# Patient Record
Sex: FEMALE | Race: WHITE | URBAN - METROPOLITAN AREA
[De-identification: names, ages, dates, MRNs, and addresses within clinical notes are randomized per-mention and may not be internally consistent; named-entity substitution may affect disease eponyms.]

---

## 2017-12-12 NOTE — TELEPHONE ENCOUNTER
APPT INFO    Date /Time: 12/18/17   Reason for Appt: laryngitis   Ref Provider/Clinic: Patients vocal    Are there internal records? If yes, list: Yes;  LETICIA Parmar in 2016   Patient Contact (Y/N) & Call Details: no   Action: Chart Reviewed

## 2017-12-18 ENCOUNTER — PRE VISIT (OUTPATIENT)
Dept: OTOLARYNGOLOGY | Facility: CLINIC | Age: 22
End: 2017-12-18

## 2017-12-18 ENCOUNTER — OFFICE VISIT (OUTPATIENT)
Dept: OTOLARYNGOLOGY | Facility: CLINIC | Age: 22
End: 2017-12-18
Payer: COMMERCIAL

## 2017-12-18 DIAGNOSIS — R49.0 DYSPHONIA: Primary | ICD-10-CM

## 2017-12-18 NOTE — PROGRESS NOTES
[facility] VOICE [facility]  Lauri Vigil Jr., M.D., F.A.C.S.  Makayla Negro M.D., M.P.H.  Zuleika Pizano, Ph.D., CCC/SLP  Joy Mckeon M.M. (voice), MRUBÉN., CCC/SLP  Brandon Krishna M.M. (voice), M.RAMON., [facility]/SLP    [facility] VOICE [facility]  VOICE/SPEECH/BREATHING EVALUATION AND LARYNGEAL EXAMINATION REPORT    Patient: Valerie Roberto  Date of Visit: 12/18/2017    HISTORY  Valerie Roberto was seen for initial voice evaluation, laryngeal examination and treatment today.  She is self-referred to this clinic, on recommendation of her , Dr. Simran Gilman, who is well-known to me. She was accompanied to this session by Dr. Gilman.  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    Symptoms began years ago    Large tonsils; multiple consultations in childhood    Hx Lyme Disease since age 17 (2012); this resulted in increased sore throats and swollen glands    Mononucleosis spring of 2016; tonsils became severely enlarged at the tail end of the course of the virus    Saw Dr. Parmar throughout this; eventual tonsillectomy in June 2016; started feeling much better, and singing improved     Able to build voice back up in [id number] school year    Minimal singing or talking this past summer, although she did try to vocalize every day    Voice problems began again this semester    Voice comes and goes; can become aphonic while singing    Loss of strength in both speech and singing    Feels hoarse in both speech and singing    Resonance pattern has changed    Feels the middle and upper-middle portion of pitch range is most impaired    Voice can be normal    Feels that voice problem is not necessarily related to how much she is using her voice    Feels that voice is not overworked, but feels vocal fatigue    Pain and dryness in throat every morning    Thinks she may be mouth-breathing    Voice can be normal even when there is pain and dryness in the  morning    Senior at [employer]    Student of Dr. Simran Gilman, who is well known to me    DIAGNOSIS/REASON FOR REFERRAL  Dysphonia/ Evaluate, perform laryngeal exam, treat as appropriate    Patient Supplied Answers To [facility] General Questionnaire  [facility] - General Form Review 12/18/2017   Are you having any of these symptoms? Total loss of voice, Increased effort to talk/sing, Throat irritation, Throat tightness, Pain/ache in throat, Shortness of breath, Poor voice quality   Are you having any of these symptoms? Voice tires easily, Voice breaks, Breathy voice   Do you use caffeine? Yes   If you do use caffeine, how many oz. do you typically drink in a day? 8   How many oz. of non-caffeinated fluid do you typically drink in a day? 10   How often do you experience heartburn, indigestion, chest pain, stomach acid coming up, and/or tasting acid in your mouth or throat? Never   Have reflux medications been recommended to you? No   If yes, are you taking them regularly? No   Voice: Yes   Swallowing: No   If anyone is helping you complete this form (such as an , clinic staff, caregiver, family member, etc.) please identify them here. Simran Gilman       Patient Supplied Answers To [facility] Intake Voice Questionnaire  [facility] 12/18/2017   How long have you had this voice problem? 4 months   In regards to your voice problem, was there anything unusual about the time the problem was first noticed (such as illness, accident, surgery, etc.)?  If yes, please describe.  You have 200 characters to respond.  We will ask for more detail at your visit. tonsillectomy and laryngitis    How quickly did the voice problem develop? Gradually   For your voice problem, how do the symptoms vary? Worse in the AM, Worse after heavy voice use   Over time, how has the voice problem changed? Worse   How would you rate your typical vocal demand? Routine: frequent periods of talking on most  days; most talking is conversational speech   Please list activities that involve your voice (such as singing, coaching, etc.): singing and customer service    Effort for speaking 6   Effort for singing 8   Overall vocal quality 3   Is your voice ever normal, even briefly? Yes   What health care professionals have you seen for this voice problem?  Who and when? none   For your voice problem, what testing/studies have you done (such as imaging/reflux testing)? none   Did you receive any therapy or treatment for your voice problem?  Please describe briefly. no   Prior to this episode, have you ever had a voice problem before?  If yes, at that time did you have therapy or treatment for the voice problem?  Please provide a brief description of that treatment. yes had surgery   For your voice problem, is there anything else you'd like to tell us? no   There isn't much I can do to help myself feel better about this problem. Disagree somewhat   How I deal with the voice problem now is under my control. Agree somewhat   I don't have much control over my emotional reactions to this problem. Strongly Disagree   When I am upset about the voice problem, I can find a way to feel better. Agree somewhat   I have control over my day-to-day reactions to the voice problem. Strongly Agree   There isn't much I can do to keep the voice problem from affecting me. Disagree somewhat   I have control over how I think about the voice problem. Strongly Agree   My reaction to the voice problem is not under my control. Strongly Disagree   VPCMEAN 1.5     Patient Supplied Answers To VHI Questionnaire  Voice Handicap Index (VHI-10) 12/18/2017   My voice makes it difficult for people to hear me 2   People have difficulty understanding me in a noisy room 2   My voice difficulties restrict my personal and social life.  3   I feel left out of conversations because of my voice 1   My voice problem causes me to lose income 1   I feel as though I have  "to strain to produce voice 3   The clarity of my voice is unpredictable 3   My voice problem upsets me 4   My voice makes me feel handicapped 1   People ask, \"What's wrong with your voice?\" 0   VHI-10 20       Patient Supplied Answers To [facility] Intake Breathing Questionnaire  No flowsheet data found.      Patient Supplied Answers To [facility] Intake Throat Discomfort Questionnaire  No flowsheet data found.    CURRENT PATIENT COMPLAINTS    Primary: voice    Denies significant dyspnea, dysphagia, or pain    OTHER PERTINENT HISTORY    No affects from Lyme Disease currently    Unremarkable; Healthy; no medications    No allergies    OBJECTIVE FINDINGS  VOICE/ SPEECH/ NON-COMMUNICATIVE LARYNGEAL BEHAVIORS EVALUATION  An evaluation of the voice was accomplished today; salient features are as follows:    Palpation of the laryngeal area shows:    Moderately firm musculature    Very high laryngeal carriage    No tenderness of the thyrohyoid area, but mild tenderness of the thyroid alae bilaterally     Moderately reduced thyrohyoid space     No additional closure of the thyrohyoid space on phonation    Massage of the thyrohyoid area feels good    Breathing pattern:    Appears essentially within normal limits and adequate     Inspirations are often inadequate for singing in volume (shallow)    No overt tension is evident.    Voice quality is characterized by    Essentially within normal limits, with glottal wyman that is well within cultural normal limits for her age, but that she finds excessive (mmore today than typical)    Some glottic stroke, especially in reading task    Very mild breathiness in speech    Mild narrow resonance    Habitual pitch is WNL in the range from A3 to E4    Loudness is WNL and appropriate for the setting    Sustained vowels:     Comfortable pitch /a/: mild breathiness after a mild glottic stroke onset, at C4    Comfortable pitch /i/: same glottic stroke onset, with slight breathiness    In a vocal " diadochokinesis task, rate and rhythm are very good; glottic coup is tight but clean and sharp    Whisper is normal; soft phonation is normal after a whispered start; a yell is narrow in resonance; she is reticent to do this task    A singing task shows voice quality is essentially consistent between speech and singing    There is breathiness at C#5; she says this is not her full voice    In a pitch glide task to determine pitch range    Highest pitch:  C6    Lowest pitch:  F3    Generally in glottal wyman at G3 and below    Some effort at Ab4    Good lift into upper register at C5    Effort and loss of control/quality at E5, then improved at higher pitches    States voice feels effortful and weak    In a demonstration of repertoire she demonstrates breathy/strained quality consistent with genre; narrow resonance; obvious register transitions and inability to find a mixed register; highly affected     she states today is a typical voice day; Dr. Gilman states this is better focus than she has had recently    she rates her effort as 7 out of 10 (10 is maximum effort) for speech    GLOBAL ASSESSMENT OF DYSPHONIA: 43 /100    CAPE-V Overall Severity:   9/100 for speech; 26/100 for singing    LARYNGEAL EXAMINATION    Endoscopic laryngeal examination was performed by:  Zuleika Pizano, Ph.D., CCC/SLP  Verbal informed consent was obtained and witnessed prior to this procedure.   Type of exam:   Flexible endoscopy with chip-tip technology and stroboscopy, right nostril; topical anesthesia with 3% Lidocaine and 0.25% phenylephrine was applied.    This exam shows:    Laryngeal and Vocal Fold Mucosa    Essentially healthy laryngeal mucosa  o Mild posterior commissure hypertrophy    mild presence of thickened secretions on the vocal folds, stranding at the anterior one-thirds    Vocal fold mucosa is   o Within normal limits, with no lesions and straight vibratory margins    It is clear that impact is at the anterior  one-thirds, and there is secretion collection there, but no actual swellings are evident    Neurological and Functional Integrity of the Larynx    Vocal folds are mobile and meet at midline; movement is brisk and symmetric; exam is neurologically normal     No fatigue is evident during a task of 20 quickly repeated vowels, but there is often a mild triangular gap on adduction    Elongation of the vocal folds for pitch increase is normal    On phonation, glottic closure is sometimes mildly weak, with a mild triangular-shaped gap, that may be consistent with a mild laryngeal isometric (simultaneous contraction of adductor and abductor muscles)    Supraglottic Function and Therapy Probes    mild anterior-posterior constriction of the supraglottic larynx during connected speech    Very mild ventricular approximation during phonation;     Supraglottic function during singing is very good    Therapy probes show   o Improved glottic configuration with forward resonance maneuvers (/i/ vowel)    Stroboscopy provided the following additional information:    The mucosal wave is within normal limits in periodicity, amplitude, symmetry, and phase, with no interruption along the length of the wave    I reviewed this laryngeal exam with Ms. Roberto today, and I provided pertinent explanations, as well as written information:    Endoscopic findings are consistent with audio-perceptual assessment and patient Hx/complaints.    her symptoms are accounted for by a mild muscular imbalance, including a mild laryngeal isometric, as well as narrowed resonance suggesting some pharyngeal muscle constriction     Because there appears to be a functional component to her symptoms, she would most likely benefit from functional speech therapy.    THERAPEUTIC ACTIVITIES  Today Ms. Roberto participated in the following therapeutic activities:    [place] concepts of applying massage and gentle downward pressure to the tender areas of her neck, in  order to reduce pain.  o Her laryngeal carriage is very high; worked on very gently coaxing a lowered carriage    [place] exercises to add phonation to an optimal flowing airstream.  o Semi-occluded vocal tract exercises with a straw (cup and bubbles) were most facilitating  o Instructed to use as remedial exercises to learn a new default for coordination of breath flow with phonation  o Instructed to become more aware of register transitions that will be made obvious by the use of the straw  o good learning, but will need practice     [place] concepts of an optimal regimen for practice.  o she should use an interval schedule of practice, with brief periods of practice frequently throughout each day  o [place] concepts of volitional practice to facilitate motor learning.  o She should avoid Contemporary Yarsanism repertoire or any known repertoire at this point, and should avoid excessive affectation while singing  o Dr. Gilman had good understanding of all these recommendations    An audio recording of today's therapeutic activities was provided, to facilitate practice.    ASSESSMENT / PLAN  IMPRESSIONS: R49.0 (Dysphonia)     Laryngeal evaluation demonstrated muscular imbalance     Dysphonia/discomfort is accounted for by the imbalanced function of the intrinsic and extrinsic laryngeal musculature    STIMULABILITY: results of therapy probes during perceptual and laryngeal evaluation demonstrate improvement with use of forward resonant stimuli  RECOMMENDATIONS:     A course of speech therapy is recommended to optimize vocal technique and promote reduced discomfort, effort and fatigue.    She demonstrates a Good prognosis for improvement given adherence to therapeutic recommendations. Therapeutic     Positive indicators: good response to probes, good comfort with vocal instruction    Negative indicators: none    she is entirely amenable to this plan    We accomplished therapy today, working on strategies to improve  vocal technique    DURATION/FREQUENCY OF TREATMENT  A single session, accomplished today    DISCHARGE SUMMARY  Ms. Roberto has had a single session of evaluation and therapy today.  Based on the evaluation, the functional therapy provided was considered medically necessary to meet the goal of reducing dysphonia.  No further intervention is deemed necessary.  Therefore, she is discharged with the goals listed below.    GOALS  Patient goal:    To have a normal and acceptable voice quality    Long-term goal(s): In 4 months, Ms. Roberto will:  1.  Report a week of typical vocal activities, in which dysphonia and effort/fatigue do not exceed a level of 2 out of 10, 80% of the time     This treatment plan was developed with the patient who agreed with the recommendations.    PRIMARY ICD-10 code:  R49.0 (Dysphonia)    TOTAL SERVICE TIME: 135 minutes  EVALUATION OF VOICE AND RESONANCE: (94229): 75 minutes    TREATMENT (76712): 30 minutes  ENDOSCOPIC LARYNGEAL EXAMINATION WITH STROBOSCOPY (08290): 30 minutes  NO CHARGE FACILITY FEE (10578)      Zuleika Pizano, Ph.D., CCC-SLP  Speech-Language Pathologist  Director, [facility]  [phone]

## 2017-12-18 NOTE — LETTER
12/18/2017       RE: Valerie Roberto  [address]  [facility] [address]     Dear Colleague,    Thank you for referring your patient, Valerie Roberto, to the [facility] at [facility]. Please see a copy of my visit note below.    [facility]  Lauri Vigil Jr., M.D., F.A.C.S.  Makayla Negro M.D., M.P.H.  Zuleika Pizano, Ph.D., CCC/SLP  Joy Mckeon M.M. (voice), M.A., CCC/SLP  Brandon Krishna M.M. (voice), M.A., [facility]/SLP    [facility]  VOICE/SPEECH/BREATHING EVALUATION AND LARYNGEAL EXAMINATION REPORT    Patient: Valerie Roberto  Date of Visit: 12/18/2017    HISTORY  Valerie Roberto was seen for initial voice evaluation, laryngeal examination and treatment today.  She is self-referred to this clinic, on recommendation of her , Dr. Simran Gilman, who is well-known to me. She was accompanied to this session by Dr. Gilman.  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    Symptoms began years ago    Large tonsils; multiple consultations in childhood    Hx Lyme Disease since age 17 (2012); this resulted in increased sore throats and swollen glands    Mononucleosis spring of 2016; tonsils became severely enlarged at the tail end of the course of the virus    Saw Dr. Parmar throughout this; eventual tonsillectomy in June 2016; started feeling much better, and singing improved     Able to build voice back up in [id number] school year    Minimal singing or talking this past summer, although she did try to vocalize every day    Voice problems began again this semester    Voice comes and goes; can become aphonic while singing    Loss of strength in both speech and singing    Feels hoarse in both speech and singing    Resonance pattern has changed    Feels the middle and upper-middle portion of pitch range is most impaired    Voice can be normal    Feels that  voice problem is not necessarily related to how much she is using her voice    Feels that voice is not overworked, but feels vocal fatigue    Pain and dryness in throat every morning    Thinks she may be mouth-breathing    Voice can be normal even when there is pain and dryness in the morning    Senior at [employer]    Student of Dr. Simran Gilman, who is well known to me    DIAGNOSIS/REASON FOR REFERRAL  Dysphonia/ Evaluate, perform laryngeal exam, treat as appropriate    Patient Supplied Answers To [facility] General Questionnaire  [facility] - General Form Review 12/18/2017   Are you having any of these symptoms? Total loss of voice, Increased effort to talk/sing, Throat irritation, Throat tightness, Pain/ache in throat, Shortness of breath, Poor voice quality   Are you having any of these symptoms? Voice tires easily, Voice breaks, Breathy voice   Do you use caffeine? Yes   If you do use caffeine, how many oz. do you typically drink in a day? 8   How many oz. of non-caffeinated fluid do you typically drink in a day? 10   How often do you experience heartburn, indigestion, chest pain, stomach acid coming up, and/or tasting acid in your mouth or throat? Never   Have reflux medications been recommended to you? No   If yes, are you taking them regularly? No   Voice: Yes   Swallowing: No   If anyone is helping you complete this form (such as an , clinic staff, caregiver, family member, etc.) please identify them here. Simran Gilman       Patient Supplied Answers To [facility] Voice Questionnaire  [facility] - Voice Review 12/18/2017   How long have you had this voice problem? 4 months   In regards to your voice problem, was there anything unusual about the time the problem was first noticed (such as illness, accident, surgery, etc.)?  If yes, please describe.  You have 200 characters to respond.  We will ask for more detail at your visit. tonsillectomy and laryngitis    How quickly  did the voice problem develop? Gradually   For your voice problem, how do the symptoms vary? Worse in the AM, Worse after heavy voice use   Over time, how has the voice problem changed? Worse   How would you rate your typical vocal demand? Routine: frequent periods of talking on most days; most talking is conversational speech   Please list activities that involve your voice (such as singing, coaching, etc.): singing and customer service    Effort for speaking 6   Effort for singing 8   Overall vocal quality 3   Is your voice ever normal, even briefly? Yes   What health care professionals have you seen for this voice problem?  Who and when? none   For your voice problem, what testing/studies have you done (such as imaging/reflux testing)? none   Did you receive any therapy or treatment for your voice problem?  Please describe briefly. no   Prior to this episode, have you ever had a voice problem before?  If yes, at that time did you have therapy or treatment for the voice problem?  Please provide a brief description of that treatment. yes had surgery   For your voice problem, is there anything else you'd like to tell us? no   There isn't much I can do to help myself feel better about this problem. Disagree somewhat   How I deal with the voice problem now is under my control. Agree somewhat   I don't have much control over my emotional reactions to this problem. Strongly Disagree   When I am upset about the voice problem, I can find a way to feel better. Agree somewhat   I have control over my day-to-day reactions to the voice problem. Strongly Agree   There isn't much I can do to keep the voice problem from affecting me. Disagree somewhat   I have control over how I think about the voice problem. Strongly Agree   My reaction to the voice problem is not under my control. Strongly Disagree   VPCMEAN 1.5     Patient Supplied Answers To VHI Questionnaire  Voice Handicap Index (VHI-10) 12/18/2017   My voice makes it  "difficult for people to hear me 2   People have difficulty understanding me in a noisy room 2   My voice difficulties restrict my personal and social life.  3   I feel left out of conversations because of my voice 1   My voice problem causes me to lose income 1   I feel as though I have to strain to produce voice 3   The clarity of my voice is unpredictable 3   My voice problem upsets me 4   My voice makes me feel handicapped 1   People ask, \"What's wrong with your voice?\" 0   VHI-10 20       Patient Supplied Answers To [facility] Breathing Questionnaire  No flowsheet data found.      Patient Supplied Answers To [facility] Throat Discomfort Questionnaire  No flowsheet data found.    CURRENT PATIENT COMPLAINTS    Primary: voice    Denies significant dyspnea, dysphagia, or pain    OTHER PERTINENT HISTORY    No affects from Lyme Disease currently    Unremarkable; Healthy; no medications    No allergies    OBJECTIVE FINDINGS  VOICE/ SPEECH/ NON-COMMUNICATIVE LARYNGEAL BEHAVIORS EVALUATION  An evaluation of the voice was accomplished today; salient features are as follows:    Palpation of the laryngeal area shows:    Moderately firm musculature    Very high laryngeal carriage    No tenderness of the thyrohyoid area, but mild tenderness of the thyroid alae bilaterally     Moderately reduced thyrohyoid space     No additional closure of the thyrohyoid space on phonation    Massage of the thyrohyoid area feels good    Breathing pattern:    Appears essentially within normal limits and adequate     Inspirations are often inadequate for singing in volume (shallow)    No overt tension is evident.    Voice quality is characterized by    Essentially within normal limits, with glottal wyman that is well within cultural normal limits for her age, but that she finds excessive (mmore today than typical)    Some glottic stroke, especially in reading task    Very mild breathiness in speech    Mild narrow resonance    Habitual pitch " is WNL in the range from A3 to E4    Loudness is WNL and appropriate for the setting    Sustained vowels:     Comfortable pitch /a/: mild breathiness after a mild glottic stroke onset, at C4    Comfortable pitch /i/: same glottic stroke onset, with slight breathiness    In a vocal diadochokinesis task, rate and rhythm are very good; glottic coup is tight but clean and sharp    Whisper is normal; soft phonation is normal after a whispered start; a yell is narrow in resonance; she is reticent to do this task    A singing task shows voice quality is essentially consistent between speech and singing    There is breathiness at C#5; she says this is not her full voice    In a pitch glide task to determine pitch range    Highest pitch:  C6    Lowest pitch:  F3    Generally in glottal wyman at G3 and below    Some effort at Ab4    Good lift into upper register at C5    Effort and loss of control/quality at E5, then improved at higher pitches    States voice feels effortful and weak    In a demonstration of repertoire she demonstrates breathy/strained quality consistent with genre; narrow resonance; obvious register transitions and inability to find a mixed register; highly affected     she states today is a typical voice day; Dr. Gilman states this is better focus than she has had recently    she rates her effort as 7 out of 10 (10 is maximum effort) for speech    GLOBAL ASSESSMENT OF DYSPHONIA: 43 /100    CAPE-V Overall Severity:   9/100 for speech; 26/100 for singing    LARYNGEAL EXAMINATION    Endoscopic laryngeal examination was performed by:  Zuleika Pizano, Ph.D., CCC/SLP  Verbal informed consent was obtained and witnessed prior to this procedure.   Type of exam:   Flexible endoscopy with chip-tip technology and stroboscopy, right nostril; topical anesthesia with 3% Lidocaine and 0.25% phenylephrine was applied.    This exam shows:    Laryngeal and Vocal Fold Mucosa    Essentially healthy laryngeal mucosa  o Mild  posterior commissure hypertrophy    mild presence of thickened secretions on the vocal folds, stranding at the anterior one-thirds    Vocal fold mucosa is   o Within normal limits, with no lesions and straight vibratory margins    It is clear that impact is at the anterior one-thirds, and there is secretion collection there, but no actual swellings are evident    Neurological and Functional Integrity of the Larynx    Vocal folds are mobile and meet at midline; movement is brisk and symmetric; exam is neurologically normal     No fatigue is evident during a task of 20 quickly repeated vowels, but there is often a mild triangular gap on adduction    Elongation of the vocal folds for pitch increase is normal    On phonation, glottic closure is sometimes mildly weak, with a mild triangular-shaped gap, that may be consistent with a mild laryngeal isometric (simultaneous contraction of adductor and abductor muscles)    Supraglottic Function and Therapy Probes    mild anterior-posterior constriction of the supraglottic larynx during connected speech    Very mild ventricular approximation during phonation;     Supraglottic function during singing is very good    Therapy probes show   o Improved glottic configuration with forward resonance maneuvers (/i/ vowel)    Stroboscopy provided the following additional information:    The mucosal wave is within normal limits in periodicity, amplitude, symmetry, and phase, with no interruption along the length of the wave    I reviewed this laryngeal exam with Ms. Roberto today, and I provided pertinent explanations, as well as written information:    Endoscopic findings are consistent with audio-perceptual assessment and patient Hx/complaints.    her symptoms are accounted for by a mild muscular imbalance, including a mild laryngeal isometric, as well as narrowed resonance suggesting some pharyngeal muscle constriction     Because there appears to be a functional component to her  symptoms, she would most likely benefit from functional speech therapy.    THERAPEUTIC ACTIVITIES  Today Ms. Roberto participated in the following therapeutic activities:    Shelton concepts of applying massage and gentle downward pressure to the tender areas of her neck, in order to reduce pain.  o Her laryngeal carriage is very high; worked on very gently coaxing a lowered carriage    Shelton exercises to add phonation to an optimal flowing airstream.  o Semi-occluded vocal tract exercises with a straw (cup and bubbles) were most facilitating  o Instructed to use as remedial exercises to learn a new default for coordination of breath flow with phonation  o Instructed to become more aware of register transitions that will be made obvious by the use of the straw  o good learning, but will need practice     Shelton concepts of an optimal regimen for practice.  o she should use an interval schedule of practice, with brief periods of practice frequently throughout each day  o Shelton concepts of volitional practice to facilitate motor learning.  o She should avoid Contemporary Faith repertoire or any known repertoire at this point, and should avoid excessive affectation while singing  o Dr. Gilman had good understanding of all these recommendations    An audio recording of today's therapeutic activities was provided, to facilitate practice.    ASSESSMENT / PLAN  IMPRESSIONS: R49.0 (Dysphonia)     Laryngeal evaluation demonstrated muscular imbalance     Dysphonia/discomfort is accounted for by the imbalanced function of the intrinsic and extrinsic laryngeal musculature    STIMULABILITY: results of therapy probes during perceptual and laryngeal evaluation demonstrate improvement with use of forward resonant stimuli  RECOMMENDATIONS:     A course of speech therapy is recommended to optimize vocal technique and promote reduced discomfort, effort and fatigue.    She demonstrates a Good prognosis for improvement given  adherence to therapeutic recommendations. Therapeutic     Positive indicators: good response to probes, good comfort with vocal instruction    Negative indicators: none    she is entirely amenable to this plan    We accomplished therapy today, working on strategies to improve vocal technique    DURATION/FREQUENCY OF TREATMENT  A single session, accomplished today    DISCHARGE SUMMARY  Ms. Roberto has had a single session of evaluation and therapy today.  Based on the evaluation, the functional therapy provided was considered medically necessary to meet the goal of reducing dysphonia.  No further intervention is deemed necessary.  Therefore, she is discharged with the goals listed below.    GOALS  Patient goal:    To have a normal and acceptable voice quality    Long-term goal(s): In 4 months, Ms. Roberto will:  1.  Report a week of typical vocal activities, in which dysphonia and effort/fatigue do not exceed a level of 2 out of 10, 80% of the time     This treatment plan was developed with the patient who agreed with the recommendations.    PRIMARY ICD-10 code:  R49.0 (Dysphonia)    TOTAL SERVICE TIME: 135 minutes  EVALUATION OF VOICE AND RESONANCE: (47858): 75 minutes    TREATMENT (17335): 30 minutes  ENDOSCOPIC LARYNGEAL EXAMINATION WITH STROBOSCOPY (91633): 30 minutes  NO CHARGE FACILITY FEE (27376)      Zuleika Pizano, Ph.D., [facility]-SLP  Speech-Language Pathologist  Director, [facility]  [phone]              Again, thank you for allowing me to participate in the care of your patient.      Sincerely,    Zuleika Pizano, SLP

## 2017-12-18 NOTE — LETTER
Date:December 28, 2017      Patient was self referred, no letter generated. Do not send.        [facility] Health Information

## 2017-12-18 NOTE — MR AVS SNAPSHOT
After Visit Summary   2017    Valerie Roberto    MRN: [id number]           Patient Information     Date Of Birth          1995        Visit Information        Provider Department      2017 10:00 AM Zuleika Pizano, SLP [facility]        Today's Diagnoses     Dysphonia    -  1       Follow-ups after your visit        Who to contact     Please call your clinic at [phone] to:    Ask questions about your health    Make or cancel appointments    Discuss your medicines    Learn about your test results    Speak to your doctor   If you have compliments or concerns about an experience at your clinic, or if you wish to file a complaint, please contact [facility] Patient Relations at [phone] or email us at [email or web address]         Additional Information About Your Visit        MyChart Information     [employer] is an electronic gateway that provides easy, online access to your medical records. With [employer], you can request a clinic appointment, read your test results, renew a prescription or communicate with your care team.     To sign up for [employer] visit the website at [email or web address]   You will be asked to enter the access code listed below, as well as some personal information. Please follow the directions to create your username and password.     Your access code is: [email or web address]  Expires: 3/25/2018  3:32 PM     Your access code will  in 90 days. If you need help or a new code, please contact your [facility] or call [phone] for assistance.        Care EveryWhere ID     This is your Care EveryWhere ID. This could be used by other organizations to access your [place] medical records  [id number]         Blood Pressure from Last 3 Encounters:   No data found for BP    Weight from Last 3 Encounters:   No data found for Wt              We Performed the Following     C BEHAVIORAL &  QUALITATIVE ANALYSIS VOICE AND RESONANCE     IMAGESTREAM RECORDING ORDER     LARYNGOSCOPY FLEX/RIGID W STROBOSCOPY     SPEECH/HEARING THERAPY, INDIVIDUAL        Primary Care Provider Office Phone # Fax #    [facility] [phone] [address]  [place] [address]        Equal Access to Services     CAILINDIANNE CECILY : Hadii aad ku hadasho Soomaali, waaxda luqadaha, qaybta kaalmada adeegyada, waxay idiin hayaan adeeg galirula larandalln rashida. So [facility] [phone].    ATENCIÓN: Si habla español, tiene a cavazos disposición servicios gratuitos de asistencia lingüística. [facility] [phone].    We comply with applicable federal civil rights laws and [place] laws. We do not discriminate on the basis of race, color, national origin, age, disability, sex, sexual orientation, or gender identity.            Thank you!     Thank you for choosing [facility]  for your care. Our goal is always to provide you with excellent care. Hearing back from our patients is one way we can continue to improve our services. Please take a few minutes to complete the written survey that you may receive in the mail after your visit with us. Thank you!             Your Updated Medication List - Protect others around you: Learn how to safely use, store and throw away your medicines at www.disposemymeds.org.      Notice  As of 12/18/2017 11:59 PM    You have not been prescribed any medications.

## 2019-04-15 ENCOUNTER — OFFICE VISIT (OUTPATIENT)
Dept: FAMILY MEDICINE | Facility: CLINIC | Age: 24
End: 2019-04-15
Payer: COMMERCIAL

## 2019-04-15 VITALS — DIASTOLIC BLOOD PRESSURE: 76 MMHG | TEMPERATURE: 99 F | SYSTOLIC BLOOD PRESSURE: 116 MMHG

## 2019-04-15 DIAGNOSIS — Z71.84 TRAVEL ADVICE ENCOUNTER: Primary | ICD-10-CM

## 2019-04-15 PROCEDURE — 99402 PREV MED CNSL INDIV APPRX 30: CPT | Mod: 25 | Performed by: NURSE PRACTITIONER

## 2019-04-15 PROCEDURE — 90471 IMMUNIZATION ADMIN: CPT | Mod: GA | Performed by: NURSE PRACTITIONER

## 2019-04-15 PROCEDURE — 90632 HEPA VACCINE ADULT IM: CPT | Mod: GA | Performed by: NURSE PRACTITIONER

## 2019-04-15 PROCEDURE — 90472 IMMUNIZATION ADMIN EACH ADD: CPT | Mod: GA | Performed by: NURSE PRACTITIONER

## 2019-04-15 PROCEDURE — 90734 MENACWYD/MENACWYCRM VACC IM: CPT | Mod: GA | Performed by: NURSE PRACTITIONER

## 2019-04-15 PROCEDURE — 90691 TYPHOID VACCINE IM: CPT | Mod: GA | Performed by: NURSE PRACTITIONER

## 2019-04-15 RX ORDER — AZITHROMYCIN 500 MG/1
500 TABLET, FILM COATED ORAL DAILY
Qty: 3 TABLET | Refills: 0 | Status: SHIPPED | OUTPATIENT
Start: 2019-04-15 | End: 2019-04-18

## 2019-04-15 RX ORDER — ATOVAQUONE AND PROGUANIL HYDROCHLORIDE 250; 100 MG/1; MG/1
1 TABLET, FILM COATED ORAL DAILY
Qty: 22 TABLET | Refills: 0 | Status: SHIPPED | OUTPATIENT
Start: 2019-04-15

## 2019-04-15 NOTE — NURSING NOTE
Chief Complaint   Patient presents with     [facility] Clinic     initial /76 (BP Location: Left arm, Cuff Size: Adult Regular)   Temp 99  F (37.2  C) (Oral)  There is no height or weight on file to calculate BMI.  BP completed using cuff size: regular.  L  arm      Health Maintenance that is potentially due pending provider review:  NONE    n/a    Humberto Mobley ma

## 2019-04-15 NOTE — PROGRESS NOTES
Nurse Note      Itinerary:  [facility]      Departure Date: 5/9/19      Return Date: 5/20/19      Length of Trip 11 days      Reason for Travel: Tourism           Urban or rural: both      Accommodations: Hotel        IMMUNIZATION HISTORY  Have you received any immunizations within the past 4 weeks?  No  Have you ever fainted from having your blood drawn or from an injection?  Yes  Have you ever had a fever reaction to vaccination?  Yes  Have you ever had any bad reaction or side effect from any vaccination?  Yes  Have you ever had hepatitis A or B vaccine?  Yes  Do you live (or work closely) with anyone who has AIDS, an AIDS-like condition, any other immune disorder or who is on chemotherapy for cancer?  No  Do you have a family history of immunodeficiency?  No  Have you received any injection of immune globulin or any blood products during the past 12 months?  No    Patient roomed by Humberto Galo  Valerie Roberto is a 24 year old female seen today alone for counsultation for international travel to [facility] for [place] work.  Patient will be departing in  3 week(s) and staying for   2 week(s) and  traveling with spouse  And friends.      Patient itinerary :  will be in the [facility] which presents risk for Malaria. exposure.      Patient's activities will include sightseeing and mission .    Patient's country of birth is [place]    Special medical concerns: hx of Lyme  Pre-travel questionnaire was completed by patient and reviewed by provider.     Vitals: /76 (BP Location: Left arm, Cuff Size: Adult Regular)   Temp 99  F (37.2  C) (Oral)   BMI= There is no height or weight on file to calculate BMI.    EXAM:  General:  Well-nourished, well-developed in no acute distress.  Appears to be stated age, interacts appropriately and expresses understanding of information given to patient.    No current outpatient medications on file.     Patient Active  Problem List   Diagnosis     Chronic tonsillitis     History of Lyme disease     No Known Allergies      Immunizations discussed include:   Hepatitis A:  Ordered/given today, risks, benefits and side effects reviewed  Hepatitis B: Up to date  Influenza: Up to date  Typhoid: Ordered/given today, risks, benefits and side effects reviewed  Rabies: Declined  reviewed managment of a animal bite or scratch (washing wound, seek medical care within 24 hours for post exposure prophylaxis )  Yellow Fever: Not indicated  Japanese Encephalitis: Not indicated  Meningococcus: Ordered/given today, risks, benefits and side effects reviewed  Tetanus/Diphtheria: Up to date  Measles/Mumps/Rubella: Up to date  Cholera: Not needed  Polio: Up to date  Pneumococcal: Under age of 65  Varicella: Immune by disease history per patient report  Zostavax:  Not indicated  Shingrix: Not indicated  HPV:  deferred  TB:  Low risk     Altitude Exposure on this trip: no  Past tolerance to Altitude: na    ASSESSMENT/PLAN:    ICD-10-CM    1. Travel advice encounter Z71.89 atovaquone-proguanil (MALARONE) 250-100 MG tablet     azithromycin (ZITHROMAX) 500 MG tablet     ADMIN 1st VACCINE     EA ADD'L VACCINE     I have reviewed general recommendations for safe travel   including: food/water precautions, insect precautions, safer sex   practices given high prevalence of Zika, HIV and other STDs,   roadway safety. Educational materials and Travax report provided.    Malaraia prophylaxis recommended: Malarone  Symptomatic treatment for traveler's diarrhea: azithromycin  Altitude illness prevention and treatment: na      Evacuation insurance advised and resources were provided to patient.    Total visit time 30 minutes  with over 50% of time spent counseling patient as detailed above.    Thuy Calderon CNP

## 2019-04-15 NOTE — PATIENT INSTRUCTIONS
Today April 15, 2019 you received the    Hepatitis A Vaccine -     Meningococcal (Menactra) Vaccine    Typhoid - injectable. This vaccine is valid for two years.   .    These appointments can be made as a NURSE ONLY visit.    **It is very important for the vaccinations to be given on the scheduled day(s), this helps ensure you receive the full effectiveness of the vaccine.**    Please call [facility] with any questions [phone]    Thank you for visiting [facility]      
no

## 2023-12-05 ENCOUNTER — LAB REQUISITION (OUTPATIENT)
Dept: LAB | Facility: CLINIC | Age: 28
End: 2023-12-05

## 2023-12-05 DIAGNOSIS — R53.83 OTHER FATIGUE: ICD-10-CM

## 2023-12-05 LAB
ERYTHROCYTE [DISTWIDTH] IN BLOOD BY AUTOMATED COUNT: 16.6 % (ref 10–15)
HCT VFR BLD AUTO: 36 % (ref 35–47)
HGB BLD-MCNC: 10.9 G/DL (ref 11.7–15.7)
MCH RBC QN AUTO: 23.3 PG (ref 26.5–33)
MCHC RBC AUTO-ENTMCNC: 30.3 G/DL (ref 31.5–36.5)
MCV RBC AUTO: 77 FL (ref 78–100)
PLATELET # BLD AUTO: 173 10E3/UL (ref 150–450)
RBC # BLD AUTO: 4.68 10E6/UL (ref 3.8–5.2)
WBC # BLD AUTO: 6.4 10E3/UL (ref 4–11)

## 2023-12-05 PROCEDURE — 84443 ASSAY THYROID STIM HORMONE: CPT | Performed by: NURSE PRACTITIONER

## 2023-12-05 PROCEDURE — 85027 COMPLETE CBC AUTOMATED: CPT | Performed by: NURSE PRACTITIONER

## 2023-12-06 LAB — TSH SERPL DL<=0.005 MIU/L-ACNC: 1.02 UIU/ML (ref 0.3–4.2)
